# Patient Record
Sex: FEMALE | URBAN - METROPOLITAN AREA
[De-identification: names, ages, dates, MRNs, and addresses within clinical notes are randomized per-mention and may not be internally consistent; named-entity substitution may affect disease eponyms.]

---

## 2023-09-04 ENCOUNTER — NURSING HOME VISIT (OUTPATIENT)
Dept: PRIMARY CARE | Facility: CLINIC | Age: 83
End: 2023-09-04
Payer: MEDICARE

## 2023-09-04 DIAGNOSIS — H91.93 BILATERAL HEARING LOSS, UNSPECIFIED HEARING LOSS TYPE: ICD-10-CM

## 2023-09-04 DIAGNOSIS — C79.51 METASTASIS TO BONE (MULTI): ICD-10-CM

## 2023-09-04 DIAGNOSIS — N39.0 E. COLI UTI: ICD-10-CM

## 2023-09-04 DIAGNOSIS — C34.90 PRIMARY MALIGNANT NEOPLASM OF LUNG METASTATIC TO OTHER SITE, UNSPECIFIED LATERALITY (MULTI): Primary | ICD-10-CM

## 2023-09-04 DIAGNOSIS — J43.9 PULMONARY EMPHYSEMA, UNSPECIFIED EMPHYSEMA TYPE (MULTI): ICD-10-CM

## 2023-09-04 DIAGNOSIS — B96.20 E. COLI UTI: ICD-10-CM

## 2023-09-04 DIAGNOSIS — C79.70 MALIGNANT NEOPLASM METASTATIC TO ADRENAL GLAND, UNSPECIFIED LATERALITY (MULTI): ICD-10-CM

## 2023-09-04 DIAGNOSIS — C78.7 METASTASIS TO LIVER (MULTI): ICD-10-CM

## 2023-09-04 DIAGNOSIS — R53.81 PHYSICAL DECONDITIONING: ICD-10-CM

## 2023-09-04 DIAGNOSIS — Z66 DNR (DO NOT RESUSCITATE): ICD-10-CM

## 2023-09-04 DIAGNOSIS — E78.5 HYPERLIPIDEMIA, UNSPECIFIED HYPERLIPIDEMIA TYPE: ICD-10-CM

## 2023-09-04 DIAGNOSIS — C79.31 METASTASIS TO BRAIN (MULTI): ICD-10-CM

## 2023-09-04 DIAGNOSIS — Z87.891 FORMER SMOKER: ICD-10-CM

## 2023-09-04 DIAGNOSIS — I10 BENIGN ESSENTIAL HTN: ICD-10-CM

## 2023-09-04 NOTE — PROGRESS NOTES
Nursing Home  History & Physical Visit    Name: Leslie Overton  MRN: [id number]  YOB: 1940  Date of Service: 9/4/2023      History of Present Illness  Pt was seen today , son was present at that time. Lives with son . Pt was admitted to [facility] with leg pain , weakness , work up showed new mets to brain, liver, adrenals, bones etc. She was started on palliative xrt and now here for rehab. She is former smoker, and was diagnosed with squamous  cell CA of lung in 2018.   She also had UTI with E Coli at [facility] and noted to have hyponatremia. Hx of HLD, HTN . [place] is [place], uses hearing aid   C/o more pain in legs, was on oxycodone at [facility] but not now .       Review of Systems  REVIEW OF SYSTEMS:   All other systems have been reviewed and are negative in relation to patient's complaint and other than what is mentioned in History of present illness.     Vital Signs  114/98, 100/min, 96.6 F 117.6 lbs 98 % on oxygen   Physical Exam  Vitals noted   Not in acute distress except c/o increase pain in legs   Conj Pink, No icterus  Neck: No Cervical LN enlargement, No Thyroid enlargement   Lungs: fair  air entry bilaterally, no rales or rhonchi  CVS: S1 S2 + , no S3. No loud heart murmur heard.   Abdomen: Soft, non tender , BS +. no organomegaly , no CVA tenderness  CNS: Pt is alert, moving all 4 extremities. no motor weakness or abnormal movements noted on gross examination.  No spine tenderness  Extremities: + edema, No calf tenderness, Franchesca's sign negative.     Assessment/Plan:    1. 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 Metastasis to liver (CMS/HCC)    3. 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 Metastasis to bone (CMS/HCC)    6. Former smoker    7. Pulmonary emphysema, unspecified emphysema type (CMS/HCC)    8. E. coli UTI    9. DNR (do not resuscitate)    10. Physical  deconditioning    11. Bilateral hearing loss, unspecified hearing loss type    12. Benign essential HTN    13. Hyperlipidemia, unspecified hyperlipidemia type         Plan:     Available medical records reviewed . Patient was examined and detailed History and physical done . All questions answered to patient's satisfaction . Total time for chart reviewing , detailed examination and coordinating care with patient was > 35 minutes.     During visit today , I asked patient as well as looked in records  in regards to advanced directive , POA etc. Pt wants to be DNR [facility]. Her son is POA who lives with her.  Questions related to it answered to pt's satisfaction .    Patient is doing fairly . She and family will meet with palliative care in few days   Pain control with meds . Side effects of medication were discussed. All questions related to medication answered to patient's satisfaction  Oxygenation   Continue OT/PT Rehab.   Current medications are effective. advised to continue current medications.  Will continue to follow   Prognosis is guarded   Patient felt satisfied with plan

## 2023-09-07 ENCOUNTER — NURSING HOME VISIT (OUTPATIENT)
Dept: POST ACUTE CARE | Facility: EXTERNAL LOCATION | Age: 83
End: 2023-09-07
Payer: MEDICARE

## 2023-09-07 DIAGNOSIS — Z51.5 ENCOUNTER FOR ADMISSION TO HOSPICE CARE: ICD-10-CM

## 2023-09-07 DIAGNOSIS — C78.7 METASTASES TO THE LIVER (MULTI): ICD-10-CM

## 2023-09-07 DIAGNOSIS — C79.51 METASTASIS TO BONE (MULTI): ICD-10-CM

## 2023-09-07 DIAGNOSIS — J43.9 PULMONARY EMPHYSEMA, UNSPECIFIED EMPHYSEMA TYPE (MULTI): ICD-10-CM

## 2023-09-07 DIAGNOSIS — C34.90 PRIMARY MALIGNANT NEOPLASM OF LUNG, UNSPECIFIED LATERALITY (MULTI): Primary | ICD-10-CM

## 2023-09-07 DIAGNOSIS — C79.70 MALIGNANT NEOPLASM METASTATIC TO ADRENAL GLAND, UNSPECIFIED LATERALITY (MULTI): ICD-10-CM

## 2023-09-07 PROCEDURE — 99309 SBSQ NF CARE MODERATE MDM 30: CPT | Performed by: INTERNAL MEDICINE

## 2023-09-08 PROBLEM — C79.51 METASTASIS TO BONE (MULTI): Status: ACTIVE | Noted: 2023-09-08

## 2023-09-08 PROBLEM — C34.90 PRIMARY MALIGNANT NEOPLASM OF LUNG (MULTI): Status: ACTIVE | Noted: 2023-09-08

## 2023-09-08 PROBLEM — J43.9 PULMONARY EMPHYSEMA (MULTI): Status: ACTIVE | Noted: 2023-09-08

## 2023-09-08 PROBLEM — C79.70: Status: ACTIVE | Noted: 2023-09-08

## 2023-09-08 PROBLEM — C78.7 METASTASES TO THE LIVER (MULTI): Status: ACTIVE | Noted: 2023-09-08

## 2023-09-08 PROBLEM — Z51.5 ENCOUNTER FOR ADMISSION TO HOSPICE CARE: Status: ACTIVE | Noted: 2023-09-08

## 2023-09-08 NOTE — PROGRESS NOTES
Nursing home follow up visit  Name: Leslie Overton  MRN: [id number]  YOB: 1940  Date of Service: 9/8/2023      Pt was evaluated during nursing home visit today  Patient is doing OK. Participating in therapy well  No sob, cp, PND, orthopnea  Eating ok, sleeping ok   Moving BM ok.   Tolerating medications well  Was seen at [facility] and started on hospice. Also put on cipro for possible UTI and retention of urine, has weir cath now.     Objective :  Vitals were noted, stable, feels tired   Patient is not any acute distress  Conjunctiva- Pink, no icterus   No cervical LN enlargement   Lungs clear, s1s2 +   No edema , No calf tenderness     Assessment:    1. 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 Metastasis to liver (CMS/HCC)    3. 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 Metastasis to bone (CMS/HCC)    6 COPD  Hospice care discussion =pt was seen at [facility] today and her xrt has been discontinued , and hospice has been involved.       Plan:  Patient is doing poorly . Prognosis is guarded   Continue OT/PT Rehab. , may not need therapy once gets on to hospice care .   Current medications are effective. advised to continue current medications.  Will continue to follow   Patient felt satisfied with plan

## 2024-06-06 NOTE — PROGRESS NOTES
Nursing home follow up visit  Name: Leslie Overton  MRN: [id number]  YOB: 1940  Date of Service: 6/6/2024      Pt was evaluated during nursing home visit today  Patient is doing OK. Participating in therapy well  No sob, cp, PND, orthopnea  Eating ok, sleeping ok   Moving BM ok.   Tolerating medications well  Was seen at [facility] and started on hospice. Also put on cipro for possible UTI and retention of urine, has weir cath now.     Objective :  Vitals were noted, stable, feels tired   Patient is not any acute distress  Conjunctiva- Pink, no icterus   No cervical LN enlargement   Lungs clear, s1s2 +   No edema , No calf tenderness     Assessment:    1. 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 Metastasis to liver (CMS/HCC)    3. 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 Metastasis to bone (CMS/HCC)    6 COPD  Hospice care discussion =pt was seen at [facility] today and her xrt has been discontinued , and hospice has been involved.       Plan:  Patient is doing poorly . Prognosis is guarded   Continue OT/PT Rehab. , may not need therapy once gets on to hospice care .   Current medications are effective. advised to continue current medications.  Will continue to follow   Patient felt satisfied with plan